# Patient Record
Sex: FEMALE | Race: WHITE
[De-identification: names, ages, dates, MRNs, and addresses within clinical notes are randomized per-mention and may not be internally consistent; named-entity substitution may affect disease eponyms.]

---

## 2020-04-19 ENCOUNTER — HOSPITAL ENCOUNTER (EMERGENCY)
Dept: HOSPITAL 8 - ED | Age: 40
Discharge: HOME | End: 2020-04-19
Payer: MEDICAID

## 2020-04-19 VITALS — SYSTOLIC BLOOD PRESSURE: 113 MMHG | DIASTOLIC BLOOD PRESSURE: 65 MMHG

## 2020-04-19 VITALS — HEIGHT: 67 IN | BODY MASS INDEX: 25.88 KG/M2 | WEIGHT: 164.91 LBS

## 2020-04-19 DIAGNOSIS — N89.8: ICD-10-CM

## 2020-04-19 DIAGNOSIS — R30.0: Primary | ICD-10-CM

## 2020-04-19 LAB
CLUE CELLS: (no result)
CULTURE INDICATED?: YES
HCG UR SG: 1.01 (ref 1–1.03)
MICROSCOPIC: (no result)
T VAGINALIS RRNA GENITAL QL PROBE: (no result)
WET PREP WBCS: (no result)

## 2020-04-19 PROCEDURE — 87591 N.GONORRHOEAE DNA AMP PROB: CPT

## 2020-04-19 PROCEDURE — 87086 URINE CULTURE/COLONY COUNT: CPT

## 2020-04-19 PROCEDURE — 87210 SMEAR WET MOUNT SALINE/INK: CPT

## 2020-04-19 PROCEDURE — 87491 CHLMYD TRACH DNA AMP PROBE: CPT

## 2020-04-19 PROCEDURE — 81001 URINALYSIS AUTO W/SCOPE: CPT

## 2020-04-19 PROCEDURE — 81025 URINE PREGNANCY TEST: CPT

## 2020-04-19 PROCEDURE — 96372 THER/PROPH/DIAG INJ SC/IM: CPT

## 2020-04-19 PROCEDURE — 87808 TRICHOMONAS ASSAY W/OPTIC: CPT

## 2020-04-19 PROCEDURE — 87077 CULTURE AEROBIC IDENTIFY: CPT

## 2020-04-19 PROCEDURE — 99284 EMERGENCY DEPT VISIT MOD MDM: CPT

## 2020-04-19 PROCEDURE — 87186 SC STD MICRODIL/AGAR DIL: CPT

## 2020-04-19 NOTE — NUR
Pt reports uvaldo she is concerned her previous infection has not cleared up or 
possible sti. Pt denies multiple partners. Pt reports partner has shown no 
symptoms.

## 2020-04-19 NOTE — NUR
Pt arrives to ed with pelvic pain and urinary increased frquency and painful 
urination. Pt reports moderate discomfort with urination.

## 2020-08-13 ENCOUNTER — HOSPITAL ENCOUNTER (EMERGENCY)
Facility: MEDICAL CENTER | Age: 40
End: 2020-08-13
Attending: EMERGENCY MEDICINE
Payer: COMMERCIAL

## 2020-08-13 VITALS
HEART RATE: 59 BPM | DIASTOLIC BLOOD PRESSURE: 67 MMHG | HEIGHT: 67 IN | TEMPERATURE: 98.4 F | WEIGHT: 138.89 LBS | RESPIRATION RATE: 16 BRPM | SYSTOLIC BLOOD PRESSURE: 109 MMHG | OXYGEN SATURATION: 99 % | BODY MASS INDEX: 21.8 KG/M2

## 2020-08-13 DIAGNOSIS — N94.9 VAGINAL DISCOMFORT: ICD-10-CM

## 2020-08-13 DIAGNOSIS — N92.6 ABNORMAL MENSTRUATION: ICD-10-CM

## 2020-08-13 LAB
ALBUMIN SERPL BCP-MCNC: 4.4 G/DL (ref 3.2–4.9)
ALBUMIN/GLOB SERPL: 1.8 G/DL
ALP SERPL-CCNC: 50 U/L (ref 30–99)
ALT SERPL-CCNC: 10 U/L (ref 2–50)
ANION GAP SERPL CALC-SCNC: 15 MMOL/L (ref 7–16)
APPEARANCE UR: CLEAR
AST SERPL-CCNC: 13 U/L (ref 12–45)
BASOPHILS # BLD AUTO: 0.9 % (ref 0–1.8)
BASOPHILS # BLD: 0.07 K/UL (ref 0–0.12)
BILIRUB SERPL-MCNC: 0.3 MG/DL (ref 0.1–1.5)
BILIRUB UR QL STRIP.AUTO: NEGATIVE
BUN SERPL-MCNC: 9 MG/DL (ref 8–22)
CALCIUM SERPL-MCNC: 9.3 MG/DL (ref 8.5–10.5)
CHLORIDE SERPL-SCNC: 105 MMOL/L (ref 96–112)
CO2 SERPL-SCNC: 19 MMOL/L (ref 20–33)
COLOR UR: YELLOW
CREAT SERPL-MCNC: 0.56 MG/DL (ref 0.5–1.4)
EOSINOPHIL # BLD AUTO: 0.04 K/UL (ref 0–0.51)
EOSINOPHIL NFR BLD: 0.5 % (ref 0–6.9)
ERYTHROCYTE [DISTWIDTH] IN BLOOD BY AUTOMATED COUNT: 45.9 FL (ref 35.9–50)
GLOBULIN SER CALC-MCNC: 2.5 G/DL (ref 1.9–3.5)
GLUCOSE SERPL-MCNC: 90 MG/DL (ref 65–99)
GLUCOSE UR STRIP.AUTO-MCNC: NEGATIVE MG/DL
HCG SERPL QL: NEGATIVE
HCT VFR BLD AUTO: 40.4 % (ref 37–47)
HGB BLD-MCNC: 13.3 G/DL (ref 12–16)
IMM GRANULOCYTES # BLD AUTO: 0.02 K/UL (ref 0–0.11)
IMM GRANULOCYTES NFR BLD AUTO: 0.2 % (ref 0–0.9)
KETONES UR STRIP.AUTO-MCNC: NEGATIVE MG/DL
LEUKOCYTE ESTERASE UR QL STRIP.AUTO: NEGATIVE
LIPASE SERPL-CCNC: 28 U/L (ref 11–82)
LYMPHOCYTES # BLD AUTO: 2 K/UL (ref 1–4.8)
LYMPHOCYTES NFR BLD: 24.6 % (ref 22–41)
MCH RBC QN AUTO: 31 PG (ref 27–33)
MCHC RBC AUTO-ENTMCNC: 32.9 G/DL (ref 33.6–35)
MCV RBC AUTO: 94.2 FL (ref 81.4–97.8)
MICRO URNS: NORMAL
MONOCYTES # BLD AUTO: 0.68 K/UL (ref 0–0.85)
MONOCYTES NFR BLD AUTO: 8.4 % (ref 0–13.4)
NEUTROPHILS # BLD AUTO: 5.31 K/UL (ref 2–7.15)
NEUTROPHILS NFR BLD: 65.4 % (ref 44–72)
NITRITE UR QL STRIP.AUTO: NEGATIVE
NRBC # BLD AUTO: 0 K/UL
NRBC BLD-RTO: 0 /100 WBC
PH UR STRIP.AUTO: 6.5 [PH] (ref 5–8)
PLATELET # BLD AUTO: 302 K/UL (ref 164–446)
PMV BLD AUTO: 9.5 FL (ref 9–12.9)
POTASSIUM SERPL-SCNC: 3.9 MMOL/L (ref 3.6–5.5)
PROT SERPL-MCNC: 6.9 G/DL (ref 6–8.2)
PROT UR QL STRIP: NEGATIVE MG/DL
RBC # BLD AUTO: 4.29 M/UL (ref 4.2–5.4)
RBC UR QL AUTO: NEGATIVE
SODIUM SERPL-SCNC: 139 MMOL/L (ref 135–145)
SP GR UR STRIP.AUTO: 1
UROBILINOGEN UR STRIP.AUTO-MCNC: 0.2 MG/DL
WBC # BLD AUTO: 8.1 K/UL (ref 4.8–10.8)

## 2020-08-13 PROCEDURE — 80053 COMPREHEN METABOLIC PANEL: CPT

## 2020-08-13 PROCEDURE — 85025 COMPLETE CBC W/AUTO DIFF WBC: CPT

## 2020-08-13 PROCEDURE — 84703 CHORIONIC GONADOTROPIN ASSAY: CPT

## 2020-08-13 PROCEDURE — 99284 EMERGENCY DEPT VISIT MOD MDM: CPT

## 2020-08-13 PROCEDURE — 83690 ASSAY OF LIPASE: CPT

## 2020-08-13 PROCEDURE — 81003 URINALYSIS AUTO W/O SCOPE: CPT

## 2020-08-13 NOTE — ED NOTES
IV removed. Pt to follow up with [facility]. understands to call for apt. Return to ed with worsening symptoms or fever.

## 2020-08-13 NOTE — ED PROVIDER NOTES
"ED Provider Note    Scribed for Ana Bailey M.D. by Westley Thacker. 8/13/2020, 12:58 PM.    Primary care provider: PRAFUL Schultz  Means of arrival: Walk-In  History obtained from: Patient  History limited by: None    CHIEF COMPLAINT  Chief Complaint   Patient presents with   • Abdominal Pain     low, \"bladder pain\"   • Painful Urination       HPI  Nadeen Ivory is a 40 y.o. female who presents to the Emergency Department for evaluation of intermittent dysuria onset 6 months ago. She presents to the ED to evaluate her symptoms since she recently moved to [place] and does not have a primary care provider. She states that her dysuria is inconsistent and often times appears abruptly after being asymptomatic. She has used antibiotics and urinary analgesics without alleviation. She additionally took two pregnancy tests since the onset of her symptoms and both have been negative. However, she suspects that she has missed her period. The patient reports associated urinary frequency and urinary urgency. Negative for fever, abnormal vaginal discharge, abnormal bowels, vomiting, back pain, abdominal pain, or chills. The patient denies any history of hypertension or diabetes.      REVIEW OF SYSTEMS  Pertinent positives include urinary frequency and urinary urgency. Pertinent negatives include no fever, abnormal vaginal discharge, abnormal bowels, vomiting, back pain, abdominal pain, or chills.  All other systems reviewed and negative.    PAST MEDICAL HISTORY   has a past medical history of Anxiety, Depression, and PTSD (post-traumatic stress disorder).    SURGICAL HISTORY   has a past surgical history that includes arthroplasty.    SOCIAL HISTORY  Social History     Tobacco Use   • Smoking status: Current Every Day Smoker     Packs/day: 1.00     Years: 0.00     Pack years: 0.00     Types: Cigarettes   • Smokeless tobacco: Never Used   Substance Use Topics   • Alcohol use: Yes     Comment: not for a few weeks   • " "Drug use: Yes     Types: Marijuana, Inhaled     Comment: marijuana daily      Social History     Substance and Sexual Activity   Drug Use Yes   • Types: Marijuana, Inhaled    Comment: marijuana daily       FAMILY HISTORY  Family History   Problem Relation Age of Onset   • Cancer Maternal Grandfather         lung   • Osteoporosis Maternal Grandfather         lupus       CURRENT MEDICATIONS  Home Medications     Reviewed by Isaiah Dejesus R.N. (Registered Nurse) on 08/13/20 at 1201  Med List Status: Complete   Medication Last Dose Status   lorazepam (ATIVAN) 1 MG TABS not taking Active   NUVARING 0.12-0.015 MG/24HR vaginal ring not using Active                ALLERGIES  Allergies   Allergen Reactions   • Penicillins        PHYSICAL EXAM  VITAL SIGNS: BP (!) 97/69   Pulse 84   Temp 36.9 °C (98.4 °F) (Temporal)   Resp 14   Ht 1.702 m (5' 7\")   Wt 63 kg (138 lb 14.2 oz)   LMP 07/10/2020   SpO2 98%   BMI 21.75 kg/m²   Constitutional: Alert in no apparent distress.  HENT: No signs of trauma, Bilateral external ears normal, Nose normal.   Eyes: Pupils are equal and reactive, Conjunctiva normal, Non-icteric.   Neck: Normal range of motion, No tenderness, Supple, No stridor.   Cardiovascular: Regular rate and rhythm, no murmurs.   Thorax & Lungs: Normal breath sounds, No respiratory distress, No wheezing, No chest tenderness.   Abdomen: Bowel sounds normal, Soft, No tenderness, No masses, No peritoneal signs.  Skin: Warm, Dry, No erythema, No rash.    Musculoskeletal:  No major deformities noted.   Neurologic: Alert, moving all extremities without difficulty, no focal deficits.      LABS  Labs Reviewed   CBC WITH DIFFERENTIAL - Abnormal; Notable for the following components:       Result Value    MCHC 32.9 (*)     All other components within normal limits   COMP METABOLIC PANEL - Abnormal; Notable for the following components:    Co2 19 (*)     All other components within normal limits   LIPASE   HCG QUAL SERUM "   URINALYSIS,CULTURE IF INDICATED   ESTIMATED GFR     All labs reviewed by me.    COURSE & MEDICAL DECISION MAKING  Pertinent Labs & Imaging studies reviewed. (See chart for details)    Differential diagnoses include but are not limited to: UTI, interstitial cystitis, or pregnancy    12:58 PM - Patient seen and examined at bedside. Ordered CBC with Differential, CMP, Lipase, HCG Qualitative, and Urinalysis to evaluate her symptoms. I will refer the patient to a primary care provider who can better follow her persistent symptoms. I ordered blood work to fully assess the patient as she has not been seen by a physician. I will await lab results before determining whether interventions are necessary. The patient is agreeable and understanding of my plan of care.       Decision Making:  This is a 40 y.o. year old female who presents with vague dysuria that is intermittent and pressure she denies any vaginal discharge.  Labs are unremarkable her urine does not show any evidence of infection.  Again she denies any vaginal pain or discharge she is feels uncomfortable.  She is having abnormal menstruation as well.  At this point I do think she can be referred to outpatient gynecology for Pap smear and further work-up regarding her irregular periods.  She is agreeable to this will be discharged     The patient will return for new or worsening symptoms and is stable at the time of discharge. Patient was given return precautions. Patient and/or family member verbalizes understanding and will comply.    DISPOSITION:  Patient will be discharged home in stable condition.    FOLLOW UP:  [facility] [place] [id number] [facility] [address]  [facility] [id number] [phone]        [address] [id number] [phone]        [facility], Emergency Dept  [address] [facility] [id number] [phone]    Return to the emergency department for  worsening pain, fever, vomiting or other concerns.      OUTPATIENT MEDICATIONS:  Discharge Medication List as of 8/13/2020  2:01 PM            FINAL IMPRESSION  1. Vaginal discomfort    2. Abnormal menstruation               This dictation has been created using voice recognition software and/or scribes. The accuracy of the dictation is limited by the abilities of the software and the expertise of the scribes. I expect there may be some errors of grammar and possibly content. I made every attempt to manually correct the errors within my dictation. However, errors related to voice recognition software and/or scribes may still exist and should be interpreted within the appropriate context.     IWestley (Scribe), am scribing for, and in the presence of, Ana Bailey M.D..    Electronically signed by: Westley Thacker (Scribe), 8/13/2020    Ana FRANK M.D. personally performed the services described in this documentation, as scribed by Westley Thacker in my presence, and it is both accurate and complete.    C.    The note accurately reflects work and decisions made by me.  Ana Bailey M.D.  8/13/2020  2:23 PM

## 2020-08-13 NOTE — ED TRIAGE NOTES
"Pt amb to triage wearing a mask.   Chief Complaint   Patient presents with   • Abdominal Pain     low, \"bladder pain\"   • Painful Urination     Pt states shes been treated multiple times for a bladder infection but symptoms are not improving. Describes discomfort as pressure. Pt also reports her period is late, reports a negative urine pregnancy test.  "

## 2022-01-17 ENCOUNTER — HOSPITAL ENCOUNTER (EMERGENCY)
Facility: MEDICAL CENTER | Age: 42
End: 2022-01-17
Payer: COMMERCIAL

## 2022-01-17 VITALS
HEIGHT: 67 IN | RESPIRATION RATE: 18 BRPM | SYSTOLIC BLOOD PRESSURE: 118 MMHG | WEIGHT: 154 LBS | OXYGEN SATURATION: 100 % | DIASTOLIC BLOOD PRESSURE: 81 MMHG | HEART RATE: 91 BPM | BODY MASS INDEX: 24.17 KG/M2 | TEMPERATURE: 99.2 F

## 2022-01-17 PROCEDURE — 302449 STATCHG TRIAGE ONLY (STATISTIC)

## 2022-01-17 ASSESSMENT — FIBROSIS 4 INDEX: FIB4 SCORE: 0.53

## 2022-01-17 NOTE — ED TRIAGE NOTES
"Nadeen Ivory  41 y.o.  Chief Complaint   Patient presents with   • Body Aches     states boyfriend gave her a deep tissue massage with a theragun yesterday morning, now having severe body aches and feeling unwell     Patient to triage for above. Pt crying in triage stating \"I feel like there is toxins built up and I need saline\". Pt denies recent coughing, sore throat, SOB or chest pain. Reports intermittent dizziness.     Took hydrocodone around 8pm with minimal relief.    Triage process explained to patient, apologized for wait time, and returned to [facility].  Pt informed to notify staff of any change in condition.  "

## 2022-06-07 ENCOUNTER — TELEPHONE (OUTPATIENT)
Dept: SCHEDULING | Facility: IMAGING CENTER | Age: 42
End: 2022-06-07
Payer: COMMERCIAL

## 2022-06-07 NOTE — TELEPHONE ENCOUNTER
Patient called checking the status of referral that was being faxed to us from [facility] for the patient to see Dr Linda. Referral was received but no notes were attached. I called and LVM with [facility] to refax the referral with notes, imaging, and labs. Patient was notified on the status of this.

## 2022-07-07 ENCOUNTER — TELEPHONE (OUTPATIENT)
Dept: OBGYN | Facility: CLINIC | Age: 42
End: 2022-07-07
Payer: COMMERCIAL

## 2022-07-08 ENCOUNTER — HOSPITAL ENCOUNTER (OUTPATIENT)
Facility: MEDICAL CENTER | Age: 42
End: 2022-07-08
Attending: STUDENT IN AN ORGANIZED HEALTH CARE EDUCATION/TRAINING PROGRAM
Payer: COMMERCIAL

## 2022-07-08 ENCOUNTER — GYNECOLOGY VISIT (OUTPATIENT)
Dept: OBGYN | Facility: CLINIC | Age: 42
End: 2022-07-08
Payer: COMMERCIAL

## 2022-07-08 VITALS
DIASTOLIC BLOOD PRESSURE: 93 MMHG | HEART RATE: 61 BPM | WEIGHT: 143 LBS | SYSTOLIC BLOOD PRESSURE: 131 MMHG | BODY MASS INDEX: 22.98 KG/M2 | HEIGHT: 66 IN

## 2022-07-08 DIAGNOSIS — R39.9 UTI SYMPTOMS: ICD-10-CM

## 2022-07-08 DIAGNOSIS — N95.2 ATROPHIC VAGINITIS: ICD-10-CM

## 2022-07-08 DIAGNOSIS — N39.0 POSTCOITAL UTI: ICD-10-CM

## 2022-07-08 DIAGNOSIS — N39.0 RECURRENT UTI: Primary | ICD-10-CM

## 2022-07-08 DIAGNOSIS — R30.0 DYSURIA: ICD-10-CM

## 2022-07-08 DIAGNOSIS — R39.82 CHRONIC BLADDER PAIN: ICD-10-CM

## 2022-07-08 DIAGNOSIS — N94.10 DYSPAREUNIA IN FEMALE: ICD-10-CM

## 2022-07-08 LAB
APPEARANCE UR: NORMAL
BILIRUB UR STRIP-MCNC: NORMAL MG/DL
COLOR UR AUTO: YELLOW
GLUCOSE UR STRIP.AUTO-MCNC: NEGATIVE MG/DL
KETONES UR STRIP.AUTO-MCNC: NORMAL MG/DL
LEUKOCYTE ESTERASE UR QL STRIP.AUTO: NEGATIVE
NITRITE UR QL STRIP.AUTO: NEGATIVE
PH UR STRIP.AUTO: 5.5 [PH] (ref 5–8)
PROT UR QL STRIP: NEGATIVE MG/DL
RBC UR QL AUTO: NORMAL
SP GR UR STRIP.AUTO: 1.02
UROBILINOGEN UR STRIP-MCNC: NORMAL MG/DL

## 2022-07-08 PROCEDURE — 81002 URINALYSIS NONAUTO W/O SCOPE: CPT | Performed by: STUDENT IN AN ORGANIZED HEALTH CARE EDUCATION/TRAINING PROGRAM

## 2022-07-08 PROCEDURE — 99204 OFFICE O/P NEW MOD 45 MIN: CPT | Performed by: STUDENT IN AN ORGANIZED HEALTH CARE EDUCATION/TRAINING PROGRAM

## 2022-07-08 PROCEDURE — 87086 URINE CULTURE/COLONY COUNT: CPT

## 2022-07-08 RX ORDER — ESTRADIOL 0.1 MG/G
CREAM VAGINAL
Qty: 1 EACH | Refills: 3 | Status: SHIPPED | OUTPATIENT
Start: 2022-07-08 | End: 2022-11-16 | Stop reason: SDUPTHER

## 2022-07-08 RX ORDER — NITROFURANTOIN MACROCRYSTALS 50 MG/1
50 CAPSULE ORAL PRN
Qty: 90 CAPSULE | Refills: 0 | Status: SHIPPED | OUTPATIENT
Start: 2022-07-08

## 2022-07-08 ASSESSMENT — FIBROSIS 4 INDEX: FIB4 SCORE: 0.54

## 2022-07-08 NOTE — PROGRESS NOTES
Urogynecology and Pelvic Reconstructive Surgery Consultation Visit    Nadeen Ivory MRN:[id number] :1980    Referred by: Delia TIPTON (urology)    Reason for Visit:   Chief Complaint   Patient presents with   • New Patient     Consult          Subjective     History of Presenting Illness:    Ms.Adrianne JOCY Ivory is a 42 y.o. year old P2 who was referred by her urology team for the evaluation and management of recurrent UTI, bladder/urethral pain, dyspareunia.     She is most bothered by pain with intercourse, both superficial and deep. She can't even masturbate as this can lead to occasional pain.  She also worries about a clitoral freckle.    She also has significant urinary frequency/urgency and occasional stress incontinence with sneeze.     Her UTI symptoms include dysuria as the most prominent. She has only had one confirmed by culture, but takes home UTI tests.  Urologist noted possible cystocele as a contributing factor to her UTIs.  There is a direct latency between sexual activity and UTI type symptoms.    She has been tested for mycoplasma/ureaplama previously at urologist and has already been treated with doxycycline with some mild improvement, but durable.     She has been seen by urology, who noted a possible cystocele, which may be contributing to her UTIs. UTIs began 1 year ago, and started when becomign more sexually active with a new partner.     She was previously referred to pelvic floor PT with Dede Santiago, which helped with the pain temporarily.  This did not exacerbate pain.    Prior Pelvic surgery:   None     Prior treatment:   Kegels  Pelvic floor PT - Niki  Doxy for mycoplasma     Fluid intake:   10 cups water    Pelvic floor symptom review:     Bladder:   Voids per day: 10+ Voids per night: 2      Urinary incontinence episodes per day: varies on sneezing    Urge leakage:  None   Stress leakage: With Cough/sneeze   Continuous / insensible urine loss: No    Nocturnal enuresis:  "No    Leakage volume: Drops   Number of pads/day: 0    Bladder emptying: Complete   Voiding symptoms: Hesitancy, Post-Void Dribble, Weak Stream and Double or Triple Voiding   UTI in last 12 months: yes - \"countless\"   Other urologic history: none      Prolapse:     Prolapse symptoms: None      Bowel:    No bowel issues      Sexual function:    Sexually active: No (for months)   Gender of partners: Male   Pain with intercourse: yes, deep in pelvis          Past medical and surgical history    Past obstetric history   Number of vaginal deliveries: 2   Number of  deliveries: 0   History of vacuum/forceps: Yes   History of obstetric anal sphincter injury: Yes    Past gynecological history:    Last menstrual period: No LMP recorded.   History of abnormal uterine bleeding: No    History of fibroids: No    History of endometrial polyps:  No    History of endometriosis: No    History of cervical dysplasia: No    Last pap: within 3 years, normal   Current contraception: none   Prior GYN surgery: none    Past medical history:  Past Medical History:   Diagnosis Date   • Anxiety    • Depression    • PTSD (post-traumatic stress disorder)      Past surgical history:  Past Surgical History:   Procedure Laterality Date   • ARTHROPLASTY      left femur     Medications:has a current medication list which includes the following prescription(s): nitrofurantoin, estradiol, lorazepam, and nuvaring.  Allergies:Penicillins  Family history:  Family History   Problem Relation Age of Onset   • Cancer Maternal Grandfather         lung   • Osteoporosis Maternal Grandfather         lupus     Social history: reports that she has quit smoking. Her smoking use included cigarettes. She smoked 0.20 packs per day for 0.00 years. She has never used smokeless tobacco. She reports previous alcohol use. She reports current drug use. Drugs: Marijuana and Inhaled.    Review of systems: A full review of systems was performed, and negative with the " "exception of want is noted above in the HPI.        Objective        BP (!) 131/93 (BP Location: Right arm, Patient Position: Sitting, BP Cuff Size: Adult)   Pulse 61   Ht 1.676 m (5' 6\")   Wt 64.9 kg (143 lb)   BMI 23.08 kg/m²     Physical Exam  Vitals reviewed. Exam conducted with a chaperone present (MA - see notes.).   Constitutional:       Appearance: Normal appearance.   HENT:      Head: Normocephalic.      Mouth/Throat:      Mouth: Mucous membranes are moist.   Cardiovascular:      Rate and Rhythm: Normal rate.   Pulmonary:      Effort: Pulmonary effort is normal.   Abdominal:      Palpations: Abdomen is soft. There is no mass.      Tenderness: There is no abdominal tenderness.   Skin:     General: Skin is warm and dry.   Neurological:      Mental Status: She is alert.   Psychiatric:         Mood and Affect: Mood normal.         Genitourinary:    External female genitalia: two small clitoral pinpoint dark lesions. Nontender, surrounding skin normal   Vulva: WNL   Bulbocavernosus reflex: Intact   Anal wink reflex: Intact   Perineal sensation: WNL   Urethra: Atrophic - mild   Vagina: Atrophic   Atrophy: Mild   Cough stress test: Negative    Pelvic floor:    POP-Q: no significant prolapse   Cervical elongation: No    Urethral tenderness: Yes   Bladder/ suprapubic tenderness: mild   Levator tenderness: mild   Levator muscle tone: Hypertonic   Pelvic floor contraction strength (modified Oxford scale): 3=Moderate   Pelvic floor contraction duration: Brief    Bimanual exam: Small, Mobile Uterus , no palpable adnexal masses, no CMT/uterine tenderness     Procedure Performed: No    Diagnostic test and records review:    Urine dipstick: cloudy, tr blood - culture sent     Post-void residual: 45mL, performed by Bladder Scanner    Labs: n/a    Radiology: n/a    Documentation reviewed: Prior EMR Records    Outside records reviewed: 11 pages           Assessment & Plan     Ms.Adrianne JOCY vIory is a 42 y.o. year old P2 " with recurrent urinary tract infection versus bladder pain syndrome, urethral pain, dyspareunia, stress urinary incontinence. We discussed my recommendations for further diagnosis and treatment at length today.     1. Recurrent UTI  2. Postcoital UTI  3. Chronic bladder pain  4. Dysuria  She has an overlapping mixture of symptoms and it is difficult to discern at this time whether she truly has recurrent urinary tract infections or chronic bladder/urethral pain, or both.  She is counseled that the most important thing to diagnose her problem is for long-term monitoring, as well as culture with any flare in symptoms to rule out bacteria or UTI.  Depending on whether her culture is resulted as UTI or negative, we can move down either the recurrent UTI pathway or chronic bladder pain pathway.  Her dysuria has already been worked up and was treated for presumed mycoplasma/Ureaplasma with doxycycline, with little to no improvement.  Her UTIs are directly related to sexual intercourse in her opinion. She has not yet tried postcoital antibiotic prophylaxis.   - I recommend that with any flare of symptoms consistent with UTI that she dropped a urine culture off at the nearest [facility].  Standing urine culture has been placed to facilitate this.  I recommend waiting on treatment until cultures result if symptoms are tolerable.   - I recommend a trial of low-dose vaginal estrogen therapy, due to the presence of mild vaginal atrophy, especially around the urethra.  It may be that she is having lower estrogen levels earlier on in life which are contributing to her symptoms.  Also, vaginal estrogen has been shown to help enhance the micro biome and increase tissue quality, subsequently decrease UTI risk and dysuria.  I recommend that she take this nightly for 2 weeks then once weekly until symptoms resolve long-term (at least 3 months)   - I also gave her the option of trying postcoital prophylaxis.  She would like to move  forward with this, and I gave her a prescription for Macrodantin 50 mg to be taken prior to intercourse.nitrofurantoin (MACRODANTIN) 50 MG Cap; Take 1 Capsule by mouth as needed (after intercourse for UTI prevention). Take one tablet after intercourse to prevent urinary tract infection. Do not use for UTI treatment.  Dispense: 90 Capsule; Refill: 0   - While not essential, it would be helpful if she did reduce sexual activity to less frequent (>1/week) while were trying to rehabilitate her pelvis and decrease pain symptoms, as trauma to the urethra as well as seeding of infection may perpetuate this problem.  This would also be to minimize antibiotic exposure.  My goal is that after we improve her symptoms that she can return to more regular intercourse.    5. Dyspareunia in female  Dyspareunia can be a multidisciplinary problem.   Pelvic pain may emanate from the bladder, reproductive organs, gastrointestinal tract, or from the muscles and joints within the pelvis. Often, no single cause is identified.     - While she does have pelvic floor hypertonicity, this did not elicit significant discomfort on examination today.  - I do recommend continuation of pelvic physical therapy to help optimize her neuromuscular functioning.  - She has no uterine or adnexal tenderness on examination, so I do not think that pelvic ultrasound is indicated at this time.        6. Atrophic vaginitis  Her exam confirms vaginal atrophy. This is likely due to low estrogen levels, which render the vaginal tissue thin, irritated, and open to colonization with gut nasreen. This can lead to irritation, dryness, painful sex, urinary infections and urinary urgency. Discussed risks, benefits, and indications for vaginal estrogen therapy.  Vaginal estrogen has negligible absorption into the bloodstream and is not associated with increased risks for uterine or breast cancers. Prescription given for estrace vaginal cream to be placed inside vagina  nightly for 2 weeks, then twice weekly thereafter. The effects of vaginal estrogen can take weeks to months.                   Odalys Linda MD, FACOG    Female Pelvic Medicine and Reconstructive Surgery  Department of Obstetrics and Gynecology  [facility]    CC: Delia Massey PAC    This medical record contains text that has been entered with the assistance of computer voice recognition and dictation software.  Therefore, it may contain unintended errors in text, spelling, punctuation, or grammar

## 2022-07-08 NOTE — PROGRESS NOTES
PT here today for consult   Ref for stress incontinence, pt also states possible prolapse and urethral pain.   Hysterectomy? X  Good #: [phone] (home)   PVR : 47 mL  Pharmacy Verified

## 2022-07-10 LAB
BACTERIA UR CULT: ABNORMAL
BACTERIA UR CULT: ABNORMAL
SIGNIFICANT IND 70042: ABNORMAL
SITE SITE: ABNORMAL
SOURCE SOURCE: ABNORMAL

## 2022-07-11 ENCOUNTER — TELEPHONE (OUTPATIENT)
Dept: OBGYN | Facility: CLINIC | Age: 42
End: 2022-07-11
Payer: COMMERCIAL

## 2022-07-11 RX ORDER — HYOSCYAMINE SULFATE, METHENAMINE, METHYLENE BLUE, PHENYL SALICYLATE, AND SODIUM PHOSPHATE, MONOBASIC, MONOHYDRATE .12; 81; 10.8; 32.4; 40.8 MG/1; MG/1; MG/1; MG/1; MG/1
1 TABLET ORAL 4 TIMES DAILY
Qty: 60 TABLET | Refills: 0 | Status: SHIPPED | OUTPATIENT
Start: 2022-07-11 | End: 2022-07-14

## 2022-07-11 NOTE — TELEPHONE ENCOUNTER
Rx for urelle sent for bladder pain as symptom control as we await work-up with cystoscopy and urodynamics.    Her culture resulted Gardnerella, which is very uncommon urinary tract infection pathogen, and she did just finish a treatment of Flagyl for 7 days prescribed by her urologist.  I suspect this is a contaminant.  She had no symptoms or signs of bacterial vaginosis on examination last week.    I suspect this is more of a bladder pain syndrome than acute UTI, and thus I think she would benefit from bladder instillation therapy every 1 to 2 weeks for 6 treatments.  This can be scheduled

## 2022-07-20 NOTE — PROGRESS NOTES
Urogynecology and Pelvic Reconstructive Surgery Follow Up    Nadeen Ivory MRN:[id number] :1980    Referred by: Delia TIPTON (urology)    Reason for Visit:   No chief complaint on file.        Subjective     History of Presenting Illness:    Ms.Adrianne JOCY Ivory is a 42 y.o. year old P2 who presents for follow up of recurrent UTI vs bladder pain syndrome, dyspareunia.     Since last visit she had a flare in symptoms,  Urine culture at last visit showed gardnerella vaginalis, immediately s/p treatment with flagyl by urologist, so likely a contaminant as this is an unlikely UTI pathogen.     She was also started on vaginal estrogen for mild atrophy and to rebuild her vaginal microbiome, which she has been using    She was prescribed Urlle for bladder discomfort but this was too expensive and not covered by insurance.     She was counseled for treatment of bladder pain and to rebuild her bladder lining with bladder instillation therapy, and presents for her first instillation today.     Initial HPI: She was referred by her urology team for the evaluation and management of recurrent UTI, bladder/urethral pain, dyspareunia.   She is most bothered by pain with intercourse, both superficial and deep. She can't even masturbate as this can lead to occasional pain.  She also worries about a clitoral freckle.  She also has significant urinary frequency/urgency and occasional stress incontinence with sneeze.   Her UTI symptoms include dysuria as the most prominent. She has only had one confirmed by culture, but takes home UTI tests.  Urologist noted possible cystocele as a contributing factor to her UTIs.  There is a direct latency between sexual activity and UTI type symptoms.  She has been tested for mycoplasma/ureaplama previously at urologist and has already been treated with doxycycline with some mild improvement, but durable.   She has been seen by urology, who noted a possible cystocele, which may be  "contributing to her UTIs. UTIs began 1 year ago, and started when becomign more sexually active with a new partner.   She was previously referred to pelvic floor PT with Dede Santiago, which helped with the pain temporarily.  This did not exacerbate pain.    Prior Pelvic surgery:   None     Prior treatment:   Anders  Pelvic floor PT - Niki  Yanni for mycoplasma     Fluid intake:   10 cups water     Urine culture:   7/10/22: >200k CFU/mL probably gardnerella vaginalis - s/p treatment with flagyl, likely contaminant    Pelvic floor symptom review:     Bladder:   Voids per day: 10+ Voids per night: 2      Urinary incontinence episodes per day: varies on sneezing    Urge leakage:  None   Stress leakage: With Cough/sneeze   Continuous / insensible urine loss: No    Nocturnal enuresis: No    Leakage volume: Drops   Number of pads/day: 0    Bladder emptying: Complete   Voiding symptoms: Hesitancy, Post-Void Dribble, Weak Stream and Double or Triple Voiding   UTI in last 12 months: yes - \"countless\"   Other urologic history: none      Prolapse:     Prolapse symptoms: None      Bowel:    No bowel issues      Sexual function:    Sexually active: No (for months)   Gender of partners: Male   Pain with intercourse: yes, deep in pelvis          Past medical and surgical history    Past obstetric history   Number of vaginal deliveries: 2   Number of  deliveries: 0   History of vacuum/forceps: Yes   History of obstetric anal sphincter injury: Yes    Past gynecological history:    Last menstrual period: No LMP recorded.   History of abnormal uterine bleeding: No    History of fibroids: No    History of endometrial polyps:  No    History of endometriosis: No    History of cervical dysplasia: No    Last pap: within 3 years, normal   Current contraception: none   Prior GYN surgery: none    Past medical history:  Past Medical History:   Diagnosis Date   • Anxiety    • Depression    • PTSD (post-traumatic stress disorder)  "     Past surgical history:  Past Surgical History:   Procedure Laterality Date   • ARTHROPLASTY      left femur     Medications:has a current medication list which includes the following prescription(s): nitrofurantoin, estradiol, lorazepam, and nuvaring.  Allergies:Penicillins  Family history:  Family History   Problem Relation Age of Onset   • Cancer Maternal Grandfather         lung   • Osteoporosis Maternal Grandfather         lupus     Social history: reports that she has quit smoking. Her smoking use included cigarettes. She smoked 0.20 packs per day for 0.00 years. She has never used smokeless tobacco. She reports previous alcohol use. She reports current drug use. Drugs: Marijuana and Inhaled.    Review of systems: A full review of systems was performed, and negative with the exception of want is noted above in the HPI.        Objective        There were no vitals taken for this visit.    Physical Exam  Vitals reviewed. Exam conducted with a chaperone present (MA - see notes.).   Constitutional:       Appearance: Normal appearance.   HENT:      Head: Normocephalic.      Mouth/Throat:      Mouth: Mucous membranes are moist.   Cardiovascular:      Rate and Rhythm: Normal rate.   Pulmonary:      Effort: Pulmonary effort is normal.   Abdominal:      Palpations: Abdomen is soft. There is no mass.      Tenderness: There is no abdominal tenderness.   Skin:     General: Skin is warm and dry.   Neurological:      Mental Status: She is alert.   Psychiatric:         Mood and Affect: Mood normal.         Genitourinary:    External female genitalia: two small clitoral pinpoint dark lesions. Nontender, surrounding skin normal   Vulva: WNL   Bulbocavernosus reflex: Intact   Anal wink reflex: Intact   Perineal sensation: WNL   Urethra: Atrophic - mild   Vagina: Atrophic   Atrophy: Mild   Cough stress test: Negative    Pelvic floor:    POP-Q: no significant prolapse   Cervical elongation: No    Urethral tenderness:  Yes   Bladder/ suprapubic tenderness: mild   Levator tenderness: mild   Levator muscle tone: Hypertonic   Pelvic floor contraction strength (modified Oxford scale): 3=Moderate   Pelvic floor contraction duration: Brief    Bimanual exam: Small, Mobile Uterus , no palpable adnexal masses, no CMT/uterine tenderness     Procedure Performed:   Bladder instillation #1    Diagnostic test and records review:       Post-void residual: 45mL, performed by Bladder Scanner    Labs: n/a    Radiology: n/a    Documentation reviewed: Prior EMR Records    Outside records reviewed: 11 pages           Assessment & Plan     Ms.Adrianne JOCY Ivory is a 42 y.o. year old P2 with recurrent urinary tract infection versus bladder pain syndrome, urethral pain, dyspareunia, stress urinary incontinence.     1. Recurrent UTI  2. Postcoital UTI  3. Chronic bladder pain  4. Dysuria  - Standing urine culture placed previously.   - I continue low-dose vaginal estrogen therapy, due to the presence of mild vaginal atrophy, especially around the urethra.   - continue postcoital macrodantin nitrofurantoin (MACRODANTIN)   - Continue bladder instillations x6 (s/p first treatment today, see procedure note)    5. Dyspareunia in female  - I recommend continuation of pelvic physical therapy to help optimize her neuromuscular functioning.  - She has no uterine or adnexal tenderness on examination, so I do not think that pelvic ultrasound is indicated at this time.        6. Atrophic vaginitis  She has vaginal atrophy on examination. Reviewed risks, benefits, and indications for vaginal estrogen therapy.  Continue with vaginal estrogen therapy twice weekly.              Odalys Linda MD, FACOG    Female Pelvic Medicine and Reconstructive Surgery  Department of Obstetrics and Gynecology  [facility]      This medical record contains text that has been entered with the assistance of computer voice  recognition and dictation software.  Therefore, it may contain unintended errors in text, spelling, punctuation, or grammar

## 2022-07-21 ENCOUNTER — GYNECOLOGY VISIT (OUTPATIENT)
Dept: OBGYN | Facility: CLINIC | Age: 42
End: 2022-07-21
Payer: COMMERCIAL

## 2022-07-21 VITALS — WEIGHT: 150 LBS | SYSTOLIC BLOOD PRESSURE: 105 MMHG | DIASTOLIC BLOOD PRESSURE: 61 MMHG | BODY MASS INDEX: 24.21 KG/M2

## 2022-07-21 DIAGNOSIS — R39.82 CHRONIC BLADDER PAIN: ICD-10-CM

## 2022-07-21 DIAGNOSIS — G89.29 CHRONIC PAIN IN FEMALE PELVIS: ICD-10-CM

## 2022-07-21 DIAGNOSIS — N95.2 ATROPHIC VAGINITIS: ICD-10-CM

## 2022-07-21 DIAGNOSIS — R10.2 CHRONIC PAIN IN FEMALE PELVIS: ICD-10-CM

## 2022-07-21 PROCEDURE — 99212 OFFICE O/P EST SF 10 MIN: CPT | Mod: 25 | Performed by: STUDENT IN AN ORGANIZED HEALTH CARE EDUCATION/TRAINING PROGRAM

## 2022-07-21 PROCEDURE — 51700 IRRIGATION OF BLADDER: CPT | Performed by: STUDENT IN AN ORGANIZED HEALTH CARE EDUCATION/TRAINING PROGRAM

## 2022-07-21 ASSESSMENT — FIBROSIS 4 INDEX: FIB4 SCORE: 0.54

## 2022-07-21 NOTE — PROCEDURES
Procedure note: Bladder instillation    Verbal consent for bladder instillation was obtained for the indication of chronic bladder pain and interstitial cystitis. Georgia Wheat chaperoned the procedure. This is instillation number 1 of 6 for initial therapy.     The urethra was exposed and cleaned with betadine. A 10F straight catheter was placed into the bladder and urine emptied. The following cocktail of medications was slowly instilled by gravity:     20mL 2% lidocaine  10 mEq bicarb  40,000 units heparin      The catheter was removed and instructions were given to hold bladder as long as possible (>30 min ideal). Sometimes bladder pain syptoms flare up temporarily immediately after bladder instillation.     She will follow up in 1 week for next instillation.     Odalys Linda MD

## 2022-07-27 NOTE — PROCEDURES
Procedure note: Bladder instillation    Verbal consent for bladder instillation was obtained for the indication of chronic bladder pain and interstitial cystitis. Georgiakoby Wheat chaperoned the procedure. This is instillation number 2 of 6 for initial therapy.     The urethra was exposed and cleaned with betadine. A 10F straight catheter was placed into the bladder and urine emptied. The following cocktail of medications was slowly instilled by gravity:     20mL 2% lidocaine  10 mEq bicarb  40,000 units heparin      The catheter was removed and instructions were given to hold bladder as long as possible (>30 min ideal). Sometimes bladder pain syptoms flare up temporarily immediately after bladder instillation.     She had significant pain/anxiety with cath placement  -lidocaine likely didn't help this time. Rx sent for any/brenda/lido cream to be used on urethral 30 mins prior to next instillation.       She will follow up in 2 weeks for next instillation.     Odalys Linda MD

## 2022-07-28 ENCOUNTER — GYNECOLOGY VISIT (OUTPATIENT)
Dept: OBGYN | Facility: CLINIC | Age: 42
End: 2022-07-28
Payer: COMMERCIAL

## 2022-07-28 VITALS
WEIGHT: 146 LBS | HEART RATE: 81 BPM | DIASTOLIC BLOOD PRESSURE: 62 MMHG | SYSTOLIC BLOOD PRESSURE: 99 MMHG | BODY MASS INDEX: 23.57 KG/M2

## 2022-07-28 DIAGNOSIS — R39.82 CHRONIC BLADDER PAIN: ICD-10-CM

## 2022-07-28 DIAGNOSIS — N39.0 POSTCOITAL UTI: ICD-10-CM

## 2022-07-28 PROCEDURE — 51700 IRRIGATION OF BLADDER: CPT | Performed by: STUDENT IN AN ORGANIZED HEALTH CARE EDUCATION/TRAINING PROGRAM

## 2022-07-28 RX ADMIN — Medication 20 ML: at 14:07

## 2022-07-28 RX ADMIN — Medication 40 MEQ: at 14:06

## 2022-07-28 ASSESSMENT — FIBROSIS 4 INDEX: FIB4 SCORE: 0.54

## 2022-08-11 ENCOUNTER — GYNECOLOGY VISIT (OUTPATIENT)
Dept: OBGYN | Facility: CLINIC | Age: 42
End: 2022-08-11
Payer: COMMERCIAL

## 2022-08-11 VITALS
BODY MASS INDEX: 23.89 KG/M2 | SYSTOLIC BLOOD PRESSURE: 106 MMHG | DIASTOLIC BLOOD PRESSURE: 69 MMHG | HEART RATE: 90 BPM | WEIGHT: 148 LBS

## 2022-08-11 DIAGNOSIS — R39.82 CHRONIC BLADDER PAIN: ICD-10-CM

## 2022-08-11 PROCEDURE — 51700 IRRIGATION OF BLADDER: CPT | Performed by: STUDENT IN AN ORGANIZED HEALTH CARE EDUCATION/TRAINING PROGRAM

## 2022-08-11 RX ORDER — HYDROXYZINE HYDROCHLORIDE 10 MG/1
10 TABLET, FILM COATED ORAL 3 TIMES DAILY PRN
COMMUNITY

## 2022-08-11 RX ORDER — TRAZODONE HYDROCHLORIDE 50 MG/1
50 TABLET ORAL NIGHTLY
COMMUNITY

## 2022-08-11 RX ADMIN — Medication 40 MEQ: at 09:19

## 2022-08-11 RX ADMIN — Medication 20 ML: at 09:19

## 2022-08-11 ASSESSMENT — FIBROSIS 4 INDEX: FIB4 SCORE: 0.54

## 2022-08-11 NOTE — PROCEDURES
Procedure note: Bladder instillation    Verbal consent for bladder instillation was obtained for the indication of chronic bladder pain and interstitial cystitis. Georgiakoby Wheat chaperoned the procedure. This is instillation number 3 of 6 for initial therapy.     The urethra was exposed and cleaned with betadine. A 10F straight catheter was placed into the bladder and urine emptied. The following cocktail of medications was slowly instilled by gravity:     20mL 2% lidocaine  10 mEq bicarb  40,000 units heparin      The catheter was removed and instructions were given to hold bladder as long as possible (>30 min ideal). Sometimes bladder pain syptoms flare up temporarily immediately after bladder instillation.     She took trazodone and hydroxyzine to help with anxiety prior to this procedure. She responded better to performing the procedure quickly without lidocaine gel or betadine prep.       She will follow up in 1 week for next instillation.     Odalys Linda MD

## 2022-08-16 ENCOUNTER — GYNECOLOGY VISIT (OUTPATIENT)
Dept: OBGYN | Facility: CLINIC | Age: 42
End: 2022-08-16
Payer: COMMERCIAL

## 2022-08-16 VITALS — WEIGHT: 147 LBS | DIASTOLIC BLOOD PRESSURE: 63 MMHG | SYSTOLIC BLOOD PRESSURE: 104 MMHG | BODY MASS INDEX: 23.73 KG/M2

## 2022-08-16 DIAGNOSIS — R39.82 CHRONIC BLADDER PAIN: ICD-10-CM

## 2022-08-16 PROCEDURE — 51700 IRRIGATION OF BLADDER: CPT | Performed by: STUDENT IN AN ORGANIZED HEALTH CARE EDUCATION/TRAINING PROGRAM

## 2022-08-16 RX ADMIN — Medication 20 ML: at 10:56

## 2022-08-16 RX ADMIN — Medication 40 MEQ: at 10:55

## 2022-08-16 NOTE — PROCEDURES
Procedure note: Bladder instillation    Verbal consent for bladder instillation was obtained for the indication of chronic bladder pain and interstitial cystitis. Georgia Wheat chaperoned the procedure. This is instillation number 4 of 6 for initial therapy.     The urethra was exposed. Betadine was not used to limit urethral instrumentation/irritation.  A 10F straight catheter was placed into the bladder and urine emptied. The following cocktail of medications was slowly instilled by gravity:     20mL 2% lidocaine  10 mEq bicarb  40,000 units heparin      The catheter was removed and instructions were given to hold bladder as long as possible (>30 min ideal). Sometimes bladder pain syptoms flare up temporarily immediately after bladder instillation.     She took trazodone and hydroxyzine to help with anxiety prior to this procedure. She responded better to performing the procedure quickly without lidocaine gel or betadine prep.       She will follow up in 1 week for next instillation.     Odalys Linda MD

## 2022-08-25 NOTE — PROCEDURES
Procedure note: Bladder instillation    Verbal consent for bladder instillation was obtained for the indication of chronic bladder pain and interstitial cystitis. Georgia Wheat chaperoned the procedure. This is instillation number 5 of 6 for initial therapy.     The urethra was exposed. Betadine was not used to limit urethral instrumentation/irritation.  A 10F straight catheter was placed into the bladder and urine emptied. The following cocktail of medications was slowly instilled by gravity:     20mL 2% lidocaine  10 mEq bicarb  40,000 units heparin      The catheter was removed and instructions were given to hold bladder as long as possible (>30 min ideal). Sometimes bladder pain syptoms flare up temporarily immediately after bladder instillation.     She took trazodone and hydroxyzine to help with anxiety prior to this procedure. She responded better to performing the procedure quickly without lidocaine gel or betadine prep.       She will follow up in 1 week for next instillation.     Odalys Linda MD

## 2022-08-26 ENCOUNTER — GYNECOLOGY VISIT (OUTPATIENT)
Dept: OBGYN | Facility: CLINIC | Age: 42
End: 2022-08-26
Payer: COMMERCIAL

## 2022-08-26 VITALS
SYSTOLIC BLOOD PRESSURE: 105 MMHG | WEIGHT: 154 LBS | HEART RATE: 82 BPM | BODY MASS INDEX: 24.86 KG/M2 | DIASTOLIC BLOOD PRESSURE: 74 MMHG

## 2022-08-26 DIAGNOSIS — R39.82 CHRONIC BLADDER PAIN: ICD-10-CM

## 2022-08-26 PROCEDURE — 51700 IRRIGATION OF BLADDER: CPT | Performed by: STUDENT IN AN ORGANIZED HEALTH CARE EDUCATION/TRAINING PROGRAM

## 2022-08-26 RX ADMIN — Medication 40 MEQ: at 13:14

## 2022-08-26 RX ADMIN — Medication 20 ML: at 13:14

## 2022-09-01 ENCOUNTER — GYNECOLOGY VISIT (OUTPATIENT)
Dept: OBGYN | Facility: CLINIC | Age: 42
End: 2022-09-01
Payer: COMMERCIAL

## 2022-09-01 VITALS
SYSTOLIC BLOOD PRESSURE: 110 MMHG | BODY MASS INDEX: 24.86 KG/M2 | HEART RATE: 84 BPM | WEIGHT: 154 LBS | DIASTOLIC BLOOD PRESSURE: 70 MMHG

## 2022-09-01 DIAGNOSIS — R30.0 DYSURIA: ICD-10-CM

## 2022-09-01 DIAGNOSIS — N39.46 URINARY INCONTINENCE, MIXED: ICD-10-CM

## 2022-09-01 DIAGNOSIS — N95.2 ATROPHIC VAGINITIS: ICD-10-CM

## 2022-09-01 DIAGNOSIS — N94.10 DYSPAREUNIA IN FEMALE: ICD-10-CM

## 2022-09-01 DIAGNOSIS — N39.0 POSTCOITAL UTI: ICD-10-CM

## 2022-09-01 DIAGNOSIS — R39.82 CHRONIC BLADDER PAIN: ICD-10-CM

## 2022-09-01 PROCEDURE — 51700 IRRIGATION OF BLADDER: CPT | Performed by: STUDENT IN AN ORGANIZED HEALTH CARE EDUCATION/TRAINING PROGRAM

## 2022-09-01 RX ADMIN — Medication 20 ML: at 08:35

## 2022-09-01 RX ADMIN — Medication 40 MEQ: at 08:36

## 2022-09-01 NOTE — PROCEDURES
Procedure note: Bladder instillation    Verbal consent for bladder instillation was obtained for the indication of chronic bladder pain and interstitial cystitis. Georgia Wheat chaperoned the procedure. This is instillation number 6 of 6 for initial therapy.     The urethra was exposed. Betadine was not used to limit urethral instrumentation/irritation.  A 10F straight catheter was placed into the bladder and urine emptied. The following cocktail of medications was slowly instilled by gravity:     20mL 2% lidocaine  10 mEq bicarb  40,000 units heparin      The catheter was removed and instructions were given to hold bladder as long as possible (>30 min ideal). Sometimes bladder pain syptoms flare up temporarily immediately after bladder instillation.         Odalys Linda MD

## 2022-09-01 NOTE — PROGRESS NOTES
Urogynecology and Pelvic Reconstructive Surgery Follow Up    Nadeen Ivory [id number] :1980    Referred by: Delia TIPTON (urology)    Reason for Visit:   Chief Complaint   Patient presents with    Procedure     Bladder Installation           Subjective     History of Presenting Illness:    Ms.Adrianne JOCY Ivory is a 42 y.o. year old P2 who presents for follow up of recurrent UTI vs bladder pain syndrome, dyspareunia. She has now completed her initial course of 6 bladder instillations.     Over the course of her bladder instillation therapy she has noticed significant improvement in her symptoms, although she still does have persistent, but improved, dysuria.    She is previously prescribed low-dose vaginal estrogen supplementation to help with her urethral tissue quality, and she has been moderately consistent with this, but has difficulty remembering to use it since is not administered on a daily basis.    While she was previously prescribed nitrofurantoin for postcoital prophylaxis, she has not been having intercourse recently so this is not played into her treatment plan.    Initial HPI: She was referred by her urology team for the evaluation and management of recurrent UTI, bladder/urethral pain, dyspareunia.   She is most bothered by pain with intercourse, both superficial and deep. She can't even masturbate as this can lead to occasional pain.  She also worries about a clitoral freckle.  She also has significant urinary frequency/urgency and occasional stress incontinence with sneeze.   Her UTI symptoms include dysuria as the most prominent. She has only had one confirmed by culture, but takes home UTI tests.  Urologist noted possible cystocele as a contributing factor to her UTIs.  There is a direct latency between sexual activity and UTI type symptoms.  She has been tested for mycoplasma/ureaplama previously at urologist and has already been treated with doxycycline with some mild  "improvement, but durable.   She has been seen by urology, who noted a possible cystocele, which may be contributing to her UTIs. UTIs began 1 year ago, and started when becomign more sexually active with a new partner.   She was previously referred to pelvic floor PT with Dede Santiago, which helped with the pain temporarily.  This did not exacerbate pain.    Prior Pelvic surgery:   None     Prior treatment:   Kegels  Pelvic floor PT - Niki  Doxy for mycoplasma     Fluid intake:   10 cups water     Urine culture:   7/10/22: >200k CFU/mL probably gardnerella vaginalis - s/p treatment with flagyl, likely contaminant    Pelvic floor symptom review:     Bladder:   Voids per day: 10+ Voids per night: 2      Urinary incontinence episodes per day: varies on sneezing    Urge leakage:  None   Stress leakage: With Cough/sneeze   Continuous / insensible urine loss: No    Nocturnal enuresis: No    Leakage volume: Drops   Number of pads/day: 0    Bladder emptying: Complete   Voiding symptoms: Hesitancy, Post-Void Dribble, Weak Stream and Double or Triple Voiding   UTI in last 12 months: yes - \"countless\" --> none since treatment initiation   Other urologic history: none      Prolapse:     Prolapse symptoms: None      Bowel:    No bowel issues      Sexual function:    Sexually active: No (for months)   Gender of partners: Male   Pain with intercourse: yes, deep in pelvis          Past medical and surgical history    Past obstetric history   Number of vaginal deliveries: 2   Number of  deliveries: 0   History of vacuum/forceps: Yes   History of obstetric anal sphincter injury: Yes    Past gynecological history:    Last menstrual period: No LMP recorded.   History of abnormal uterine bleeding: No    History of fibroids: No    History of endometrial polyps:  No    History of endometriosis: No    History of cervical dysplasia: No    Last pap: within 3 years, normal   Current contraception: none   Prior GYN surgery: " none    Past medical history:  Past Medical History:   Diagnosis Date    Anxiety     Depression     PTSD (post-traumatic stress disorder)      Past surgical history:  Past Surgical History:   Procedure Laterality Date    ARTHROPLASTY      left femur     Medications:has a current medication list which includes the following prescription(s): carbamazepine, trazodone, hydroxyzine hcl, nitrofurantoin, estradiol, lorazepam, and nuvaring.  Allergies:Penicillins  Family history:  Family History   Problem Relation Age of Onset    Cancer Maternal Grandfather         lung    Osteoporosis Maternal Grandfather         lupus     Social history: reports that she has quit smoking. Her smoking use included cigarettes. She smoked an average of 0.20 packs per day. She has never used smokeless tobacco. She reports that she does not currently use alcohol. She reports current drug use. Drugs: Marijuana and Inhaled.    Review of systems: A full review of systems was performed, and negative with the exception of want is noted above in the HPI.        Objective        /70 (BP Location: Left arm, Patient Position: Sitting, BP Cuff Size: Adult)   Pulse 84   Wt 69.9 kg (154 lb)   BMI 24.86 kg/m²     Physical Exam  Vitals reviewed. Exam conducted with a chaperone present (MA - see notes.).   Constitutional:       Appearance: Normal appearance.   HENT:      Head: Normocephalic.      Mouth/Throat:      Mouth: Mucous membranes are moist.   Cardiovascular:      Rate and Rhythm: Normal rate.   Pulmonary:      Effort: Pulmonary effort is normal.   Abdominal:      Palpations: Abdomen is soft. There is no mass.      Tenderness: There is no abdominal tenderness.   Skin:     General: Skin is warm and dry.   Neurological:      Mental Status: She is alert.   Psychiatric:         Mood and Affect: Mood normal.       Genitourinary:    External female genitalia: two small clitoral pinpoint dark lesions. Nontender, surrounding skin normal   Vulva:  WNL   Bulbocavernosus reflex: Intact   Anal wink reflex: Intact   Perineal sensation: WNL   Urethra: Atrophic - mild   Vagina: Atrophic   Atrophy: Mild   Cough stress test: Negative    Pelvic floor:    POP-Q: no significant prolapse   Cervical elongation: No    Urethral tenderness: Yes   Bladder/ suprapubic tenderness: mild   Levator tenderness: mild   Levator muscle tone: Hypertonic   Pelvic floor contraction strength (modified Oxford scale): 3=Moderate   Pelvic floor contraction duration: Brief    Bimanual exam: Small, Mobile Uterus , no palpable adnexal masses, no CMT/uterine tenderness     Procedure Performed:   Bladder instillation #6 -see report    Diagnostic test and records review:       Post-void residual (today): 25mL, performed by Bladder Scanner    Labs: n/a    Radiology: n/a    Documentation reviewed: Prior EMR Records             Assessment & Plan     Ms.Adrianne JOCY Ivory is a 42 y.o. year old P2 with recurrent urinary tract infection versus bladder pain syndrome, urethral pain, dyspareunia, stress urinary incontinence, now status post initiation bladder instillation therapy.     1. Recurrent UTI  2. Postcoital UTI  3. Chronic bladder pain  4. Dysuria  -Improvement in symptoms with bladder installations, especially her dysuria, although some dysuria persists.  - Counseled again to drop off urine culture if she has any major flares and symptoms in the future to rule out recurrent UTIs.   - I recommend continuing low-dose vaginal estrogen therapy, due to the presence of mild vaginal atrophy, especially around the urethra.   - continue postcoital macrodantin nitrofurantoin (MACRODANTIN), when she feels comfortable having intercourse  - In the future she has significant flare in symptoms, besides submitting a urine culture she will be candidate for top up bladder instillation therapy    5. Dyspareunia in female  - I recommend continuation of pelvic physical therapy to help optimize her neuromuscular  functioning.  She has not been able to connect  - She has no uterine or adnexal tenderness on prior examination, so I do not think that pelvic ultrasound is indicated at this time.        6. Atrophic vaginitis  She has vaginal atrophy on examination. Reviewed risks, benefits, and indications for vaginal estrogen therapy.  Continue with vaginal estrogen therapy 1-2 times weekly.     7. Urinary incontinence, mixed  We will focus on treating pain symptoms first, as most interventions may exacerbate pain for her, especially for stress leakage.  Continue to monitor           Odalys Linda MD, FACOG    Female Pelvic Medicine and Reconstructive Surgery  Department of Obstetrics and Gynecology  [facility]      This medical record contains text that has been entered with the assistance of computer voice recognition and dictation software.  Therefore, it may contain unintended errors in text, spelling, punctuation, or grammar

## 2022-11-16 ENCOUNTER — TELEPHONE (OUTPATIENT)
Dept: OBGYN | Facility: CLINIC | Age: 42
End: 2022-11-16
Payer: COMMERCIAL

## 2022-11-16 RX ORDER — ESTRADIOL 0.1 MG/G
CREAM VAGINAL
Qty: 1 EACH | Refills: 3 | Status: SHIPPED | OUTPATIENT
Start: 2022-11-16

## 2022-11-16 NOTE — TELEPHONE ENCOUNTER
Pt called in and states that she went to [facility] pharmacy @ [address] to pick-up her Estradiol prescription but there is nothing ready for her. Advise Pt I will forward her message to the provider and allow time for his response. Pt understood and has no further concerns.

## 2023-12-05 ENCOUNTER — APPOINTMENT (OUTPATIENT)
Dept: URGENT CARE | Facility: CLINIC | Age: 43
End: 2023-12-05
Payer: COMMERCIAL

## 2023-12-11 ENCOUNTER — TELEPHONE (OUTPATIENT)
Dept: URGENT CARE | Facility: CLINIC | Age: 43
End: 2023-12-11